# Patient Record
Sex: FEMALE | URBAN - METROPOLITAN AREA
[De-identification: names, ages, dates, MRNs, and addresses within clinical notes are randomized per-mention and may not be internally consistent; named-entity substitution may affect disease eponyms.]

---

## 2023-10-20 ENCOUNTER — OFFICE VISIT (OUTPATIENT)
Dept: INTERNAL MEDICINE CLINIC | Age: 23
End: 2023-10-20

## 2023-10-20 VITALS
TEMPERATURE: 97.9 F | OXYGEN SATURATION: 98 % | WEIGHT: 116 LBS | DIASTOLIC BLOOD PRESSURE: 76 MMHG | SYSTOLIC BLOOD PRESSURE: 108 MMHG | HEART RATE: 102 BPM | BODY MASS INDEX: 18.21 KG/M2 | HEIGHT: 67 IN

## 2023-10-20 DIAGNOSIS — F32.A DEPRESSION, UNSPECIFIED DEPRESSION TYPE: Primary | ICD-10-CM

## 2023-10-20 DIAGNOSIS — Z00.00 PREVENTATIVE HEALTH CARE: ICD-10-CM

## 2023-10-20 DIAGNOSIS — L65.9 ALOPECIA: ICD-10-CM

## 2023-10-20 DIAGNOSIS — F41.9 ANXIETY: ICD-10-CM

## 2023-10-20 DIAGNOSIS — R00.0 TACHYCARDIA: ICD-10-CM

## 2023-10-20 LAB
25(OH)D3 SERPL-MCNC: 44.5 NG/ML
ALBUMIN SERPL-MCNC: 4.3 G/DL (ref 3.4–5)
ALBUMIN/GLOB SERPL: 1.5 {RATIO} (ref 1.1–2.2)
ALP SERPL-CCNC: 43 U/L (ref 40–129)
ALT SERPL-CCNC: 12 U/L (ref 10–40)
ANION GAP SERPL CALCULATED.3IONS-SCNC: 10 MMOL/L (ref 3–16)
AST SERPL-CCNC: 15 U/L (ref 15–37)
BASOPHILS # BLD: 0 K/UL (ref 0–0.2)
BASOPHILS NFR BLD: 0.5 %
BILIRUB SERPL-MCNC: 0.5 MG/DL (ref 0–1)
BUN SERPL-MCNC: 10 MG/DL (ref 7–20)
CALCIUM SERPL-MCNC: 9.3 MG/DL (ref 8.3–10.6)
CHLORIDE SERPL-SCNC: 102 MMOL/L (ref 99–110)
CO2 SERPL-SCNC: 27 MMOL/L (ref 21–32)
CREAT SERPL-MCNC: 0.6 MG/DL (ref 0.6–1.1)
DEPRECATED RDW RBC AUTO: 13.1 % (ref 12.4–15.4)
EOSINOPHIL # BLD: 0.1 K/UL (ref 0–0.6)
EOSINOPHIL NFR BLD: 0.9 %
GFR SERPLBLD CREATININE-BSD FMLA CKD-EPI: >60 ML/MIN/{1.73_M2}
GLUCOSE SERPL-MCNC: 84 MG/DL (ref 70–99)
HCT VFR BLD AUTO: 41.5 % (ref 36–48)
HGB BLD-MCNC: 13.9 G/DL (ref 12–16)
LYMPHOCYTES # BLD: 1.3 K/UL (ref 1–5.1)
LYMPHOCYTES NFR BLD: 14.2 %
MCH RBC QN AUTO: 30.9 PG (ref 26–34)
MCHC RBC AUTO-ENTMCNC: 33.5 G/DL (ref 31–36)
MCV RBC AUTO: 92.2 FL (ref 80–100)
MONOCYTES # BLD: 0.3 K/UL (ref 0–1.3)
MONOCYTES NFR BLD: 3.7 %
NEUTROPHILS # BLD: 7.6 K/UL (ref 1.7–7.7)
NEUTROPHILS NFR BLD: 80.7 %
PLATELET # BLD AUTO: 307 K/UL (ref 135–450)
PMV BLD AUTO: 9.2 FL (ref 5–10.5)
POTASSIUM SERPL-SCNC: 5.3 MMOL/L (ref 3.5–5.1)
PROT SERPL-MCNC: 7.1 G/DL (ref 6.4–8.2)
RBC # BLD AUTO: 4.5 M/UL (ref 4–5.2)
SODIUM SERPL-SCNC: 139 MMOL/L (ref 136–145)
TSH SERPL DL<=0.005 MIU/L-ACNC: 1.59 UIU/ML (ref 0.27–4.2)
WBC # BLD AUTO: 9.3 K/UL (ref 4–11)

## 2023-10-20 RX ORDER — ESCITALOPRAM OXALATE 10 MG/1
10 TABLET ORAL DAILY
Qty: 30 TABLET | Refills: 1 | Status: SHIPPED | OUTPATIENT
Start: 2023-10-20

## 2023-10-20 RX ORDER — LEVONORGESTREL AND ETHINYL ESTRADIOL 0.15-0.03
1 KIT ORAL DAILY
COMMUNITY
Start: 2023-10-11

## 2023-10-20 RX ORDER — HYDROXYZINE HYDROCHLORIDE 25 MG/1
25 TABLET, FILM COATED ORAL NIGHTLY PRN
Qty: 30 TABLET | Refills: 0 | Status: SHIPPED | OUTPATIENT
Start: 2023-10-20 | End: 2023-11-19

## 2023-10-20 SDOH — ECONOMIC STABILITY: INCOME INSECURITY: HOW HARD IS IT FOR YOU TO PAY FOR THE VERY BASICS LIKE FOOD, HOUSING, MEDICAL CARE, AND HEATING?: NOT VERY HARD

## 2023-10-20 SDOH — ECONOMIC STABILITY: HOUSING INSECURITY
IN THE LAST 12 MONTHS, WAS THERE A TIME WHEN YOU DID NOT HAVE A STEADY PLACE TO SLEEP OR SLEPT IN A SHELTER (INCLUDING NOW)?: NO

## 2023-10-20 SDOH — ECONOMIC STABILITY: FOOD INSECURITY: WITHIN THE PAST 12 MONTHS, YOU WORRIED THAT YOUR FOOD WOULD RUN OUT BEFORE YOU GOT MONEY TO BUY MORE.: NEVER TRUE

## 2023-10-20 SDOH — ECONOMIC STABILITY: FOOD INSECURITY: WITHIN THE PAST 12 MONTHS, THE FOOD YOU BOUGHT JUST DIDN'T LAST AND YOU DIDN'T HAVE MONEY TO GET MORE.: NEVER TRUE

## 2023-10-20 ASSESSMENT — PATIENT HEALTH QUESTIONNAIRE - PHQ9
SUM OF ALL RESPONSES TO PHQ QUESTIONS 1-9: 2
SUM OF ALL RESPONSES TO PHQ QUESTIONS 1-9: 2
1. LITTLE INTEREST OR PLEASURE IN DOING THINGS: 1
SUM OF ALL RESPONSES TO PHQ QUESTIONS 1-9: 2
2. FEELING DOWN, DEPRESSED OR HOPELESS: 1
SUM OF ALL RESPONSES TO PHQ QUESTIONS 1-9: 2
SUM OF ALL RESPONSES TO PHQ9 QUESTIONS 1 & 2: 2

## 2023-10-20 ASSESSMENT — ENCOUNTER SYMPTOMS
ROS SKIN COMMENTS: HAIR LOSS
CONSTIPATION: 0

## 2023-10-20 NOTE — PROGRESS NOTES
Date: 10/20/2023                                               Subjective/Objective:     Chief Complaint   Patient presents with    [facility]    Anxiety    Depression    Alopecia       HPI    Charity Monteiro is a 22 yo female, visit today to [facility] care. Former PCP with Yaima Deal. She is a nurse at [employer].     [address] - has felt like anxiety has always been a problem, can't remember a time she wasn't anxious. Has been feeling depressed since middle school. Previously prescribed sertraline (about a year ago) which was of no benefit. Had side effects with bupropion. Had SI as child, no recent SI. Feels like lately her depression and anxiety symptoms have been more noticeable. Struggles with sleep, especially when she has to get up for work. Worked with a therapist previously and this was helpful. Alopecia - has been going on for the last year. Feels like when she showers she has handfuls of hair come out, always has hair on her clothes, hair is thinner than it used to be. Has always been told her heart rate is high. Denies associated palpitations, dizziness and syncope. Sometimes has chest pain with this. Has gotten up to 150. Already received flu shot. Gyn: BCM: OCP. Last pap smear: follows with gyn               GYN care managed by: [facility]             Patient Active Problem List    Diagnosis Date Noted    Depression 10/20/2023    Anxiety 10/20/2023       Past Medical History:   Diagnosis Date    Anxiety     Asthma     Depression        Past Surgical History:   Procedure Laterality Date    WISDOM TOOTH EXTRACTION         No results found for any previous visit.        Family History   Problem Relation Age of Onset    Cancer Mother         non hodgkin lymphoma    Autism Sister        Current Outpatient Medications   Medication Sig Dispense Refill    levonorgestrel-ethinyl estradiol (SEASONALE) 0.15-0.03 MG per tablet Take 1 tablet by mouth daily

## 2023-10-20 NOTE — PATIENT INSTRUCTIONS
Start hydroxyzine, take nightly as needed  Start lexapro, take daily and follow up in 6 weeks, sooner if needed  Schedule with Dr Jeffrey Tovar

## 2023-10-22 DIAGNOSIS — E87.5 HYPERKALEMIA: ICD-10-CM

## 2023-10-22 DIAGNOSIS — R00.0 TACHYCARDIA: ICD-10-CM

## 2023-10-22 DIAGNOSIS — L65.9 ALOPECIA: Primary | ICD-10-CM

## 2023-11-02 ENCOUNTER — HOSPITAL ENCOUNTER (OUTPATIENT)
Age: 23
Discharge: HOME | End: 2023-11-02
Payer: COMMERCIAL

## 2023-11-02 ENCOUNTER — HOSPITAL ENCOUNTER (OUTPATIENT)
Dept: NON INVASIVE DIAGNOSTICS | Age: 23
Discharge: HOME | End: 2023-11-02
Payer: COMMERCIAL

## 2023-11-02 DIAGNOSIS — R00.0 TACHYCARDIA: ICD-10-CM

## 2023-11-02 DIAGNOSIS — E87.5 HYPERKALEMIA: ICD-10-CM

## 2023-11-02 LAB — POTASSIUM SERPL-SCNC: 3.7 MMOL/L (ref 3.5–5.1)

## 2023-11-02 PROCEDURE — 36415 COLL VENOUS BLD VENIPUNCTURE: CPT

## 2023-11-02 PROCEDURE — 93225 XTRNL ECG REC<48 HRS REC: CPT

## 2023-11-02 PROCEDURE — 93226 XTRNL ECG REC<48 HR SCAN A/R: CPT

## 2023-11-02 PROCEDURE — 84132 ASSAY OF SERUM POTASSIUM: CPT

## 2023-11-07 LAB
ACQUISITION DURATION: NORMAL S
AVERAGE HEART RATE: 102 BPM
EKG DIAGNOSIS: NORMAL
HOLTER MAX HEART RATE: 152 BPM
HOOKUP DATE: NORMAL
HOOKUP TIME: NORMAL
MAX HEART RATE TIME/DATE: NORMAL
MIN HEART RATE TIME/DATE: NORMAL
MIN HEART RATE: 61 BPM
NUMBER OF QRS COMPLEXES: NORMAL
NUMBER OF SUPRAVENTRICULAR COUPLETS: 0
NUMBER OF SUPRAVENTRICULAR ECTOPICS: 3
NUMBER OF SUPRAVENTRICULAR ISOLATED BEATS: 3
NUMBER OF VENTRICULAR BIGEMINAL CYCLES: 0
NUMBER OF VENTRICULAR COUPLETS: 1
NUMBER OF VENTRICULAR ECTOPICS: 86

## 2023-11-13 DIAGNOSIS — F41.9 ANXIETY: ICD-10-CM

## 2023-11-13 DIAGNOSIS — F32.A DEPRESSION, UNSPECIFIED DEPRESSION TYPE: ICD-10-CM

## 2023-11-13 RX ORDER — HYDROXYZINE HYDROCHLORIDE 25 MG/1
25 TABLET, FILM COATED ORAL NIGHTLY PRN
Qty: 90 TABLET | Refills: 0 | Status: SHIPPED | OUTPATIENT
Start: 2023-11-13 | End: 2024-02-11

## 2023-11-13 RX ORDER — ESCITALOPRAM OXALATE 10 MG/1
10 TABLET ORAL DAILY
Qty: 90 TABLET | Refills: 0 | Status: SHIPPED | OUTPATIENT
Start: 2023-11-13 | End: 2024-02-11

## 2023-11-13 NOTE — TELEPHONE ENCOUNTER
Future Appointments   Date Time Provider [address]   12/4/2023 10:15 AM Javier Thornton, [address] [facility]         Last appointment 10/20/23

## 2023-12-03 NOTE — PROGRESS NOTES
Supraventricular Couplets 11/02/2023 0   Final    Average Heart Rate 11/02/2023 102  BPM Final    Holter Max Heart Rate 11/02/2023 152  BPM Final    Min Heart Rate 11/02/2023 61  BPM Final    Max Heart Rate Time/Date 11/02/2023 [id number]   Final    Min Heart Rate Time/Date 11/02/2023 [id number]   Final    Diagnosis 11/02/2023    Final                    Value: This 48 hour test was scanned 11/7/2023 11:00. Tech comments:1. The rhythm was sinus. Average HI interval 0.16 average QRS duration 0.08. Sinus Tachycardia for 50% test duration. Maximum  R-R 3.16 secondsoccurring during diary entry \"almost passed out, getting blood work done\". 2. Diary returned with entry of \"almost passed out, getting blood work done\". ImpressionNormal holter study. Pt's symptoms of\"almost passing out\" were not associated with any cardiac arrthymiasConfirmed by Jose Asher MD, [facility] ([id number]) on 11/7/2023 1:48:52 PM      Potassium 11/02/2023 3.7  3.5 - 5.1 mmol/L Final       Family History   Problem Relation Age of Onset    Cancer Mother         non hodgkin lymphoma    Autism Sister        Current Outpatient Medications   Medication Sig Dispense Refill    escitalopram (LEXAPRO) 20 MG tablet Take 1 tablet by mouth daily 90 tablet 0    traZODone (DESYREL) 50 MG tablet Take 1 tablet by mouth nightly as needed for Sleep 90 tablet 1    hydrOXYzine HCl (ATARAX) 25 MG tablet Take 1 tablet by mouth nightly as needed for Anxiety 90 tablet 0    levonorgestrel-ethinyl estradiol (SEASONALE) 0.15-0.03 MG per tablet Take 1 tablet by mouth daily       No current facility-administered medications for this visit. Allergies   Allergen Reactions    Shellfish Allergy Anaphylaxis       Review of Systems   Constitutional:  Negative for chills and fever. Psychiatric/Behavioral:  Positive for dysphoric mood and sleep disturbance. Negative for suicidal ideas. The patient is nervous/anxious.         Vitals:  /88 (Site: Right Upper Arm, Position: Sitting,

## 2023-12-04 ENCOUNTER — OFFICE VISIT (OUTPATIENT)
Dept: INTERNAL MEDICINE CLINIC | Age: 23
End: 2023-12-04
Payer: COMMERCIAL

## 2023-12-04 VITALS
SYSTOLIC BLOOD PRESSURE: 110 MMHG | BODY MASS INDEX: 18.83 KG/M2 | HEIGHT: 67 IN | DIASTOLIC BLOOD PRESSURE: 88 MMHG | OXYGEN SATURATION: 98 % | HEART RATE: 88 BPM | WEIGHT: 120 LBS

## 2023-12-04 DIAGNOSIS — F41.9 ANXIETY: ICD-10-CM

## 2023-12-04 DIAGNOSIS — G47.9 SLEEP TROUBLE: ICD-10-CM

## 2023-12-04 DIAGNOSIS — F32.A DEPRESSION, UNSPECIFIED DEPRESSION TYPE: Primary | ICD-10-CM

## 2023-12-04 PROCEDURE — 99214 OFFICE O/P EST MOD 30 MIN: CPT | Performed by: NURSE PRACTITIONER

## 2023-12-04 RX ORDER — TRAZODONE HYDROCHLORIDE 50 MG/1
50 TABLET ORAL NIGHTLY PRN
Qty: 90 TABLET | Refills: 1 | Status: SHIPPED | OUTPATIENT
Start: 2023-12-04

## 2023-12-04 RX ORDER — ESCITALOPRAM OXALATE 20 MG/1
20 TABLET ORAL DAILY
Qty: 90 TABLET | Refills: 0 | Status: SHIPPED | OUTPATIENT
Start: 2023-12-04 | End: 2024-03-03

## 2023-12-04 NOTE — PATIENT INSTRUCTIONS
Increase lexapro to 20 mg daily  Try trazodone nightly as needed for sleep - may try two if one is not effective

## 2024-01-28 NOTE — PROGRESS NOTES
Date: 1/29/2024                                               Subjective/Objective:     Chief Complaint   Patient presents with    Medication Check     8 week follow up on Lexapro.  Seems to be working per patient       HPI    Aliyah Martinez is a 24 yo female, visit today for follow up on anxiety and depression. She has previously failed sertraline and had side effects with bupropion. Escitalopram increased last OV, this has been very effective. She denies SI.     Trazodone has been very effective for sleep.     Has noticed her legs \"jumping\" at times since starting the lexapro. This hasn't been bothersome. She denies other medication side effects.    A few weeks ago she noticed a tender lump behind the left ear. No fever/chills. She reports that about a week ago this drained bloody fluid overnight. This did provide a lot of relief, no longer tender. She continues to have a somewhat tender area that is discolored. Has not been picking at this.      Gyn: BCM: OCP.                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

## 2024-01-29 ENCOUNTER — OFFICE VISIT (OUTPATIENT)
Dept: INTERNAL MEDICINE CLINIC | Age: 24
End: 2024-01-29
Payer: COMMERCIAL

## 2024-01-29 VITALS
SYSTOLIC BLOOD PRESSURE: 110 MMHG | DIASTOLIC BLOOD PRESSURE: 60 MMHG | TEMPERATURE: 97.7 F | BODY MASS INDEX: 18.83 KG/M2 | OXYGEN SATURATION: 98 % | WEIGHT: 120.25 LBS | HEART RATE: 108 BPM

## 2024-01-29 DIAGNOSIS — G47.9 SLEEP TROUBLE: ICD-10-CM

## 2024-01-29 DIAGNOSIS — F32.A DEPRESSION, UNSPECIFIED DEPRESSION TYPE: Primary | ICD-10-CM

## 2024-01-29 DIAGNOSIS — F41.9 ANXIETY: ICD-10-CM

## 2024-01-29 DIAGNOSIS — H93.8X2 EAR LUMP, LEFT: ICD-10-CM

## 2024-01-29 PROCEDURE — 99214 OFFICE O/P EST MOD 30 MIN: CPT | Performed by: NURSE PRACTITIONER

## 2024-01-29 ASSESSMENT — PATIENT HEALTH QUESTIONNAIRE - PHQ9
4. FEELING TIRED OR HAVING LITTLE ENERGY: 0
7. TROUBLE CONCENTRATING ON THINGS, SUCH AS READING THE NEWSPAPER OR WATCHING TELEVISION: 0
SUM OF ALL RESPONSES TO PHQ9 QUESTIONS 1 & 2: 0
10. IF YOU CHECKED OFF ANY PROBLEMS, HOW DIFFICULT HAVE THESE PROBLEMS MADE IT FOR YOU TO DO YOUR WORK, TAKE CARE OF THINGS AT HOME, OR GET ALONG WITH OTHER PEOPLE: 0
SUM OF ALL RESPONSES TO PHQ QUESTIONS 1-9: 0
2. FEELING DOWN, DEPRESSED OR HOPELESS: 0
SUM OF ALL RESPONSES TO PHQ QUESTIONS 1-9: 0
5. POOR APPETITE OR OVEREATING: 0
9. THOUGHTS THAT YOU WOULD BE BETTER OFF DEAD, OR OF HURTING YOURSELF: 0
SUM OF ALL RESPONSES TO PHQ QUESTIONS 1-9: 0
1. LITTLE INTEREST OR PLEASURE IN DOING THINGS: 0
3. TROUBLE FALLING OR STAYING ASLEEP: 0
8. MOVING OR SPEAKING SO SLOWLY THAT OTHER PEOPLE COULD HAVE NOTICED. OR THE OPPOSITE, BEING SO FIGETY OR RESTLESS THAT YOU HAVE BEEN MOVING AROUND A LOT MORE THAN USUAL: 0
SUM OF ALL RESPONSES TO PHQ QUESTIONS 1-9: 0
6. FEELING BAD ABOUT YOURSELF - OR THAT YOU ARE A FAILURE OR HAVE LET YOURSELF OR YOUR FAMILY DOWN: 0

## 2024-02-09 DIAGNOSIS — F41.9 ANXIETY: ICD-10-CM

## 2024-02-09 DIAGNOSIS — F32.A DEPRESSION, UNSPECIFIED DEPRESSION TYPE: ICD-10-CM

## 2024-02-09 RX ORDER — ESCITALOPRAM OXALATE 10 MG/1
10 TABLET ORAL DAILY
Qty: 90 TABLET | Refills: 0 | OUTPATIENT
Start: 2024-02-09

## 2024-02-15 DIAGNOSIS — F41.9 ANXIETY: ICD-10-CM

## 2024-02-15 DIAGNOSIS — F32.A DEPRESSION, UNSPECIFIED DEPRESSION TYPE: ICD-10-CM

## 2024-02-15 RX ORDER — ESCITALOPRAM OXALATE 20 MG/1
20 TABLET ORAL DAILY
Qty: 90 TABLET | Refills: 1 | Status: SHIPPED | OUTPATIENT
Start: 2024-02-15 | End: 2024-08-13

## 2024-05-08 ENCOUNTER — TELEPHONE (OUTPATIENT)
Dept: INTERNAL MEDICINE CLINIC | Age: 24
End: 2024-05-08

## 2024-05-08 NOTE — TELEPHONE ENCOUNTER
Would recommend she see one of the local travel clinics (one of them is [facility]) as they carry appropriate vaccines for international travel

## 2024-06-06 RX ORDER — ONDANSETRON 4 MG/1
4 TABLET, FILM COATED ORAL 3 TIMES DAILY PRN
Qty: 15 TABLET | Refills: 0 | Status: SHIPPED | OUTPATIENT
Start: 2024-06-06

## 2024-07-10 ENCOUNTER — OFFICE VISIT (OUTPATIENT)
Age: 24
End: 2024-07-10

## 2024-07-10 VITALS
HEART RATE: 100 BPM | OXYGEN SATURATION: 96 % | HEIGHT: 67 IN | SYSTOLIC BLOOD PRESSURE: 114 MMHG | TEMPERATURE: 98.3 F | WEIGHT: 129 LBS | BODY MASS INDEX: 20.25 KG/M2 | RESPIRATION RATE: 18 BRPM | DIASTOLIC BLOOD PRESSURE: 79 MMHG

## 2024-07-10 DIAGNOSIS — J02.9 SORE THROAT: Primary | ICD-10-CM

## 2024-07-10 LAB — STREPTOCOCCUS A RNA: NOT DETECTED

## 2024-07-10 NOTE — PATIENT INSTRUCTIONS
Please stay hydrated and use salt water garggles and monitor symptoms.    If Symptoms persist beyond one week please return here.

## 2024-07-10 NOTE — PROGRESS NOTES
Negative for neck pain and neck stiffness.   Skin:  Negative for rash.       Physical Exam  Vitals and nursing note reviewed.   Constitutional:       Appearance: Normal appearance.   HENT:      Head: Normocephalic and atraumatic.      Right Ear: Tympanic membrane, ear canal and external ear normal.      Left Ear: Tympanic membrane, ear canal and external ear normal.      Nose: Nose normal.      Mouth/Throat:      Mouth: Mucous membranes are moist.      Pharynx: Posterior oropharyngeal erythema present.   Eyes:      Extraocular Movements: Extraocular movements intact.      Conjunctiva/sclera: Conjunctivae normal.      Pupils: Pupils are equal, round, and reactive to light.   Cardiovascular:      Rate and Rhythm: Normal rate and regular rhythm.      Pulses: Normal pulses.      Heart sounds: Normal heart sounds.   Pulmonary:      Effort: Pulmonary effort is normal.      Breath sounds: Normal breath sounds. No decreased breath sounds.   Musculoskeletal:      Cervical back: Full passive range of motion without pain, normal range of motion and neck supple. No rigidity.   Skin:     General: Skin is warm.      Capillary Refill: Capillary refill takes less than 2 seconds.   Neurological:      Mental Status: She is alert.   Psychiatric:         Behavior: Behavior is cooperative.           An electronic signature was used to authenticate this note.    --SERENE Rodriguez

## 2024-07-11 ASSESSMENT — ENCOUNTER SYMPTOMS
COUGH: 0
NAUSEA: 0
VOMITING: 0
SHORTNESS OF BREATH: 0
CONSTIPATION: 0
DIARRHEA: 0
CHEST TIGHTNESS: 0
ABDOMINAL PAIN: 0
SORE THROAT: 1

## 2024-08-23 DIAGNOSIS — G47.9 SLEEP TROUBLE: ICD-10-CM

## 2024-08-23 RX ORDER — TRAZODONE HYDROCHLORIDE 50 MG/1
50 TABLET, FILM COATED ORAL DAILY PRN
Qty: 90 TABLET | Refills: 0 | Status: SHIPPED | OUTPATIENT
Start: 2024-08-23

## 2024-08-26 NOTE — PROGRESS NOTES
Date: 8/27/2024                                               Subjective/Objective:     Chief Complaint   Patient presents with    Medication Check       HPI    Aliyah Martinez is a 22 yo female, visit today for follow up on anxiety and depression. She denies concerns today.    Anxiety and depression - escitalopram has been very effective. She denies SI.    Trazodone has been very effective for sleep.     Gyn: BCM: OCP.                Last pap smear: follows with gyn               GYN care managed by: [facility]           Patient Active Problem List    Diagnosis Date Noted    Depression 10/20/2023    Anxiety 10/20/2023       Past Medical History:   Diagnosis Date    Anxiety     Asthma     Depression        Past Surgical History:   Procedure Laterality Date    WISDOM TOOTH EXTRACTION         Office Visit on 07/10/2024   Component Date Value Ref Range Status    Streptococcus A RNA 07/10/2024 not detected   Final       Family History   Problem Relation Age of Onset    Cancer Mother         non hodgkin lymphoma    Autism Sister        Current Outpatient Medications   Medication Sig Dispense Refill    escitalopram (LEXAPRO) 20 MG tablet Take 1 tablet by mouth daily 90 tablet 1    traZODone (DESYREL) 50 MG tablet TAKE 1 TABLET BY MOUTH EVERY DAY AT BEDTIME AS NEEDED FOR SLEEP 90 tablet 0    levonorgestrel-ethinyl estradiol (SEASONALE) 0.15-0.03 MG per tablet Take 1 tablet by mouth daily       No current facility-administered medications for this visit.       Allergies   Allergen Reactions    Shellfish Allergy Anaphylaxis       Review of Systems   Constitutional:  Negative for chills and fever.   Psychiatric/Behavioral:  Negative for dysphoric mood and suicidal ideas. The patient is not nervous/anxious.        Vitals:  /60 (Site: Left Upper Arm, Position: Sitting, Cuff Size: Medium Adult)   Pulse 90   Ht 1.702 m (5' 7.01\")   Wt 57.2 kg (126 lb)   LMP 06/05/2024   SpO2 97%   BMI 19.73 kg/m²     Physical

## 2024-08-27 ENCOUNTER — OFFICE VISIT (OUTPATIENT)
Dept: INTERNAL MEDICINE CLINIC | Age: 24
End: 2024-08-27
Payer: COMMERCIAL

## 2024-08-27 VITALS
BODY MASS INDEX: 19.78 KG/M2 | HEIGHT: 67 IN | SYSTOLIC BLOOD PRESSURE: 110 MMHG | WEIGHT: 126 LBS | HEART RATE: 90 BPM | OXYGEN SATURATION: 97 % | DIASTOLIC BLOOD PRESSURE: 60 MMHG

## 2024-08-27 DIAGNOSIS — F32.A DEPRESSION, UNSPECIFIED DEPRESSION TYPE: ICD-10-CM

## 2024-08-27 DIAGNOSIS — F41.9 ANXIETY: Primary | ICD-10-CM

## 2024-08-27 PROCEDURE — 99213 OFFICE O/P EST LOW 20 MIN: CPT | Performed by: NURSE PRACTITIONER

## 2024-08-27 RX ORDER — ESCITALOPRAM OXALATE 20 MG/1
20 TABLET ORAL DAILY
Qty: 90 TABLET | Refills: 1 | Status: SHIPPED | OUTPATIENT
Start: 2024-08-27 | End: 2025-02-23

## 2024-11-30 DIAGNOSIS — G47.9 SLEEP TROUBLE: ICD-10-CM

## 2024-12-02 RX ORDER — TRAZODONE HYDROCHLORIDE 50 MG/1
50 TABLET, FILM COATED ORAL DAILY PRN
Qty: 90 TABLET | Refills: 0 | Status: SHIPPED | OUTPATIENT
Start: 2024-12-02

## 2025-01-06 DIAGNOSIS — F32.A DEPRESSION, UNSPECIFIED DEPRESSION TYPE: ICD-10-CM

## 2025-01-06 DIAGNOSIS — F41.9 ANXIETY: ICD-10-CM

## 2025-01-07 RX ORDER — ESCITALOPRAM OXALATE 20 MG/1
20 TABLET ORAL DAILY
Qty: 90 TABLET | Refills: 1 | Status: SHIPPED | OUTPATIENT
Start: 2025-01-07 | End: 2025-07-06

## 2025-01-07 NOTE — TELEPHONE ENCOUNTER
Medication:   Requested Prescriptions     Pending Prescriptions Disp Refills    escitalopram (LEXAPRO) 20 MG tablet 90 tablet 1     Sig: Take 1 tablet by mouth daily       Last Filled:      Patient Phone Number: [phone] (home)     Last appt: 8/27/2024   Next appt: Visit date not found  No future appointments.

## 2025-02-14 ENCOUNTER — OFFICE VISIT (OUTPATIENT)
Dept: INTERNAL MEDICINE CLINIC | Age: 25
End: 2025-02-14
Payer: COMMERCIAL

## 2025-02-14 VITALS
HEART RATE: 94 BPM | WEIGHT: 144 LBS | HEIGHT: 67 IN | OXYGEN SATURATION: 96 % | SYSTOLIC BLOOD PRESSURE: 108 MMHG | DIASTOLIC BLOOD PRESSURE: 60 MMHG | BODY MASS INDEX: 22.6 KG/M2

## 2025-02-14 DIAGNOSIS — R07.9 CHEST PAIN, UNSPECIFIED TYPE: Primary | ICD-10-CM

## 2025-02-14 DIAGNOSIS — R42 DIZZINESS: ICD-10-CM

## 2025-02-14 LAB
ALBUMIN SERPL-MCNC: 3.8 G/DL (ref 3.4–5)
ALBUMIN/GLOB SERPL: 1.4 {RATIO} (ref 1.1–2.2)
ALP SERPL-CCNC: 69 U/L (ref 40–129)
ANION GAP SERPL CALCULATED.3IONS-SCNC: 10 MMOL/L (ref 3–16)
AST SERPL-CCNC: 38 U/L (ref 15–37)
BILIRUB SERPL-MCNC: 0.3 MG/DL (ref 0–1)
BUN SERPL-MCNC: 13 MG/DL (ref 7–20)
CALCIUM SERPL-MCNC: 9 MG/DL (ref 8.3–10.6)
CHLORIDE SERPL-SCNC: 102 MMOL/L (ref 99–110)
CO2 SERPL-SCNC: 24 MMOL/L (ref 21–32)
CREAT SERPL-MCNC: 0.7 MG/DL (ref 0.6–1.1)
D-DIMER QUANTITATIVE: 0.52 UG/ML FEU (ref 0–0.6)
GFR SERPLBLD CREATININE-BSD FMLA CKD-EPI: >90 ML/MIN/{1.73_M2}
GLUCOSE SERPL-MCNC: 131 MG/DL (ref 70–99)
PROT SERPL-MCNC: 6.5 G/DL (ref 6.4–8.2)
REASON FOR REJECTION: NORMAL
REJECTED TEST: NORMAL
SODIUM SERPL-SCNC: 136 MMOL/L (ref 136–145)

## 2025-02-14 PROCEDURE — 99214 OFFICE O/P EST MOD 30 MIN: CPT | Performed by: NURSE PRACTITIONER

## 2025-02-14 PROCEDURE — 93000 ELECTROCARDIOGRAM COMPLETE: CPT | Performed by: NURSE PRACTITIONER

## 2025-02-14 RX ORDER — ALBUTEROL SULFATE 90 UG/1
2 INHALANT RESPIRATORY (INHALATION) 4 TIMES DAILY PRN
Qty: 54 G | Refills: 0 | Status: SHIPPED | OUTPATIENT
Start: 2025-02-14

## 2025-02-14 ASSESSMENT — ENCOUNTER SYMPTOMS
CHEST TIGHTNESS: 1
SHORTNESS OF BREATH: 1
COUGH: 0
VOMITING: 0
DIARRHEA: 0
NAUSEA: 0
WHEEZING: 0

## 2025-02-14 ASSESSMENT — PATIENT HEALTH QUESTIONNAIRE - PHQ9
SUM OF ALL RESPONSES TO PHQ QUESTIONS 1-9: 0
1. LITTLE INTEREST OR PLEASURE IN DOING THINGS: NOT AT ALL
SUM OF ALL RESPONSES TO PHQ QUESTIONS 1-9: 0
2. FEELING DOWN, DEPRESSED OR HOPELESS: NOT AT ALL
SUM OF ALL RESPONSES TO PHQ QUESTIONS 1-9: 0
SUM OF ALL RESPONSES TO PHQ QUESTIONS 1-9: 0
SUM OF ALL RESPONSES TO PHQ9 QUESTIONS 1 & 2: 0

## 2025-02-14 NOTE — PROGRESS NOTES
Date: 2/14/2025                                               Subjective/Objective:     Chief Complaint   Patient presents with    Congestion    Chest Congestion     Started about a week ago    Dizziness    Headache       HPI    Aliyah Martinez is a 25 yo female, visit today for c/o headache and dizziness. Symptoms began one week ago. Has had a little congestion and headache, mild and not bothersome. She is having constant chest tightness. She has had episodes of SOB and lightheadedness - once while working out and once while in [employer]. No syncope. She denies associated cough, fever/chill, N/VD. She denies known sick contacts.  No recent travel. Non smoker. She does take hormonal birth control.   No FH clotting that she is aware of.   Has been staying well hydrated.   No new medications.     Was treated for asthma as a child.    BAM: OCP         Patient Active Problem List    Diagnosis Date Noted    Depression 10/20/2023    Anxiety 10/20/2023       Past Medical History:   Diagnosis Date    Anxiety     Asthma     Depression        Past Surgical History:   Procedure Laterality Date    WISDOM TOOTH EXTRACTION         Office Visit on 07/10/2024   Component Date Value Ref Range Status    Streptococcus A RNA 07/10/2024 not detected   Final       Family History   Problem Relation Age of Onset    Cancer Mother         non hodgkin lymphoma    Autism Sister        Current Outpatient Medications   Medication Sig Dispense Refill    albuterol sulfate HFA (VENTOLIN HFA) 108 (90 Base) MCG/ACT inhaler Inhale 2 puffs into the lungs 4 times daily as needed for Wheezing 54 g 0    escitalopram (LEXAPRO) 20 MG tablet Take 1 tablet by mouth daily 90 tablet 1    traZODone (DESYREL) 50 MG tablet TAKE 1 TABLET BY MOUTH EVERY DAY AT BEDTIME AS NEEDED FOR SLEEP 90 tablet 0    levonorgestrel-ethinyl estradiol (SEASONALE) 0.15-0.03 MG per tablet Take 1 tablet by mouth daily       No current facility-administered medications for this

## 2025-02-15 ENCOUNTER — HOSPITAL ENCOUNTER (OUTPATIENT)
Age: 25
Discharge: HOME | End: 2025-02-15
Payer: COMMERCIAL

## 2025-02-15 DIAGNOSIS — R07.9 CHEST PAIN, UNSPECIFIED TYPE: ICD-10-CM

## 2025-02-15 DIAGNOSIS — R42 DIZZINESS: ICD-10-CM

## 2025-02-15 LAB
BASOPHILS # BLD: 0.1 K/UL (ref 0–0.2)
BASOPHILS NFR BLD: 0.9 %
DEPRECATED RDW RBC AUTO: 13 % (ref 12.4–15.4)
EOSINOPHIL # BLD: 0.1 K/UL (ref 0–0.6)
EOSINOPHIL NFR BLD: 0.8 %
HCT VFR BLD AUTO: 40.5 % (ref 36–48)
HGB BLD-MCNC: 13.6 G/DL (ref 12–16)
LYMPHOCYTES # BLD: 1.5 K/UL (ref 1–5.1)
LYMPHOCYTES NFR BLD: 15.9 %
MCH RBC QN AUTO: 30.4 PG (ref 26–34)
MCHC RBC AUTO-ENTMCNC: 33.6 G/DL (ref 31–36)
MCV RBC AUTO: 90.3 FL (ref 80–100)
MONOCYTES # BLD: 0.5 K/UL (ref 0–1.3)
MONOCYTES NFR BLD: 5.2 %
NEUTROPHILS # BLD: 7.2 K/UL (ref 1.7–7.7)
NEUTROPHILS NFR BLD: 77.2 %
PLATELET # BLD AUTO: 325 K/UL (ref 135–450)
PMV BLD AUTO: 9.2 FL (ref 5–10.5)
POTASSIUM SERPL-SCNC: 4 MMOL/L (ref 3.5–5.1)
RBC # BLD AUTO: 4.49 M/UL (ref 4–5.2)
WBC # BLD AUTO: 9.3 K/UL (ref 4–11)

## 2025-02-15 PROCEDURE — 36415 COLL VENOUS BLD VENIPUNCTURE: CPT

## 2025-02-15 PROCEDURE — 84132 ASSAY OF SERUM POTASSIUM: CPT

## 2025-02-15 PROCEDURE — 85025 COMPLETE CBC W/AUTO DIFF WBC: CPT

## 2025-02-16 DIAGNOSIS — R07.9 CHEST PAIN, UNSPECIFIED TYPE: ICD-10-CM

## 2025-02-17 RX ORDER — ALBUTEROL SULFATE 90 UG/1
2 INHALANT RESPIRATORY (INHALATION) 4 TIMES DAILY PRN
Qty: 54 G | Refills: 0 | OUTPATIENT
Start: 2025-02-17

## 2025-05-09 ENCOUNTER — OFFICE VISIT (OUTPATIENT)
Dept: INTERNAL MEDICINE CLINIC | Age: 25
End: 2025-05-09
Payer: COMMERCIAL

## 2025-05-09 VITALS
WEIGHT: 142 LBS | TEMPERATURE: 98.3 F | DIASTOLIC BLOOD PRESSURE: 64 MMHG | BODY MASS INDEX: 22.29 KG/M2 | SYSTOLIC BLOOD PRESSURE: 116 MMHG | HEIGHT: 67 IN | HEART RATE: 82 BPM | OXYGEN SATURATION: 97 %

## 2025-05-09 DIAGNOSIS — R19.7 DIARRHEA, UNSPECIFIED TYPE: ICD-10-CM

## 2025-05-09 DIAGNOSIS — R10.9 ABDOMINAL CRAMPING: ICD-10-CM

## 2025-05-09 DIAGNOSIS — R19.7 DIARRHEA, UNSPECIFIED TYPE: Primary | ICD-10-CM

## 2025-05-09 PROCEDURE — 99213 OFFICE O/P EST LOW 20 MIN: CPT | Performed by: NURSE PRACTITIONER

## 2025-05-09 RX ORDER — DICYCLOMINE HYDROCHLORIDE 10 MG/1
10 CAPSULE ORAL
Qty: 120 CAPSULE | Refills: 0 | Status: SHIPPED | OUTPATIENT
Start: 2025-05-09

## 2025-05-09 ASSESSMENT — ENCOUNTER SYMPTOMS
NAUSEA: 0
DIARRHEA: 1
VOMITING: 0
ABDOMINAL PAIN: 1

## 2025-05-09 NOTE — PATIENT INSTRUCTIONS
Stool samples - take to [facility] lab tomorrow morning  May try immodium after getting stool samples  Bentyl 4 times a day  Go to ER for any vomiting or fevers

## 2025-05-09 NOTE — PROGRESS NOTES
Date: 5/9/2025                                               Subjective/Objective:     Chief Complaint   Patient presents with    Diarrhea     About 2 weeks ago, pains when having a BM.       HPI    Aliyah Martinez is a 25 yo female, visit today for c/o diarrhea. This problem began two weeks ago. Has been having 2-4 bowel movements daily. This had been alternating between soft and watery, today has just been watery. No blood in the stool. She is having cramping with this. Denies nausea/vomiting, fevers. Appetite isn't great, worried it will make her have to use the bathroom. Has never had this happen before. Denies known sick contacts. Has not taken anything for this. Typically she struggles with constipation - typically has a BM once every 2-3 days, hard. Is not taking any stool softeners/laxatives. No recent travel.     Was seen for chest pain, dizziness 2/14/2025.   Is using albuterol with exercise.   Denies any further episodes of chest pain, dizziness..     LMP: current          Patient Active Problem List    Diagnosis Date Noted    Depression 10/20/2023    Anxiety 10/20/2023       Past Medical History:   Diagnosis Date    Anxiety     Asthma     Depression        Past Surgical History:   Procedure Laterality Date    WISDOM TOOTH EXTRACTION         Hospital Outpatient Visit on 02/15/2025   Component Date Value Ref Range Status    Potassium 02/15/2025 4.0  3.5 - 5.1 mmol/L Final    WBC 02/15/2025 9.3  4.0 - 11.0 K/uL Final    RBC 02/15/2025 4.49  4.00 - 5.20 M/uL Final    Hemoglobin 02/15/2025 13.6  12.0 - 16.0 g/dL Final    Hematocrit 02/15/2025 40.5  36.0 - 48.0 % Final    MCV 02/15/2025 90.3  80.0 - 100.0 fL Final    MCH 02/15/2025 30.4  26.0 - 34.0 pg Final    MCHC 02/15/2025 33.6  31.0 - 36.0 g/dL Final    RDW 02/15/2025 13.0  12.4 - 15.4 % Final    Platelets 02/15/2025 325  135 - 450 K/uL Final    MPV 02/15/2025 9.2  5.0 - 10.5 fL Final    Neutrophils % 02/15/2025 77.2  % Final    Lymphocytes %

## 2025-05-10 LAB
C DIFF TOX A+B STL QL IA: NORMAL
GI PATHOGENS PNL STL NAA+PROBE: NORMAL

## 2025-05-12 ENCOUNTER — RESULTS FOLLOW-UP (OUTPATIENT)
Dept: INTERNAL MEDICINE CLINIC | Age: 25
End: 2025-05-12

## 2025-05-13 DIAGNOSIS — R19.7 DIARRHEA, UNSPECIFIED TYPE: Primary | ICD-10-CM

## 2025-05-13 DIAGNOSIS — R10.9 ABDOMINAL CRAMPING: ICD-10-CM

## 2025-06-03 DIAGNOSIS — G47.9 SLEEP TROUBLE: ICD-10-CM

## 2025-06-03 RX ORDER — TRAZODONE HYDROCHLORIDE 50 MG/1
50 TABLET ORAL NIGHTLY PRN
Qty: 90 TABLET | Refills: 1 | Status: SHIPPED | OUTPATIENT
Start: 2025-06-03

## 2025-06-04 DIAGNOSIS — R19.7 DIARRHEA, UNSPECIFIED TYPE: ICD-10-CM

## 2025-06-04 DIAGNOSIS — R10.9 ABDOMINAL CRAMPING: ICD-10-CM

## 2025-06-04 RX ORDER — DICYCLOMINE HYDROCHLORIDE 10 MG/1
10 CAPSULE ORAL
Qty: 120 CAPSULE | Refills: 0 | Status: SHIPPED | OUTPATIENT
Start: 2025-06-04

## 2025-06-04 NOTE — TELEPHONE ENCOUNTER
Vm left for patient about prescription & needing a follow up appt.  To call office if any questions or to schedule appt

## 2025-06-09 ENCOUNTER — HOSPITAL ENCOUNTER (OUTPATIENT)
Age: 25
Discharge: HOME | End: 2025-06-09
Payer: COMMERCIAL

## 2025-06-09 ENCOUNTER — HOSPITAL ENCOUNTER (OUTPATIENT)
Dept: GENERAL RADIOLOGY | Age: 25
Discharge: HOME | End: 2025-06-09
Attending: INTERNAL MEDICINE
Payer: COMMERCIAL

## 2025-06-09 PROCEDURE — 74018 RADEX ABDOMEN 1 VIEW: CPT

## 2025-07-06 DIAGNOSIS — R10.9 ABDOMINAL CRAMPING: ICD-10-CM

## 2025-07-06 DIAGNOSIS — R19.7 DIARRHEA, UNSPECIFIED TYPE: ICD-10-CM

## 2025-07-07 RX ORDER — DICYCLOMINE HYDROCHLORIDE 10 MG/1
10 CAPSULE ORAL
Qty: 360 CAPSULE | Refills: 1 | Status: SHIPPED | OUTPATIENT
Start: 2025-07-07